# Patient Record
Sex: FEMALE | Race: NATIVE HAWAIIAN OR OTHER PACIFIC ISLANDER | HISPANIC OR LATINO
[De-identification: names, ages, dates, MRNs, and addresses within clinical notes are randomized per-mention and may not be internally consistent; named-entity substitution may affect disease eponyms.]

---

## 2024-02-27 ENCOUNTER — TRANSCRIPTION ENCOUNTER (OUTPATIENT)
Age: 44
End: 2024-02-27

## 2024-02-27 ENCOUNTER — INPATIENT (INPATIENT)
Facility: HOSPITAL | Age: 44
LOS: 1 days | Discharge: HOME | DRG: 41 | End: 2024-02-29
Attending: SPECIALIST | Admitting: SPECIALIST
Payer: COMMERCIAL

## 2024-02-27 VITALS
WEIGHT: 115.08 LBS | DIASTOLIC BLOOD PRESSURE: 63 MMHG | TEMPERATURE: 98 F | SYSTOLIC BLOOD PRESSURE: 110 MMHG | HEART RATE: 79 BPM | OXYGEN SATURATION: 97 % | HEIGHT: 63 IN | RESPIRATION RATE: 20 BRPM

## 2024-02-27 LAB
ALBUMIN SERPL ELPH-MCNC: 4.7 G/DL — SIGNIFICANT CHANGE UP (ref 3.3–5)
ALP SERPL-CCNC: 62 U/L — SIGNIFICANT CHANGE UP (ref 40–120)
ALT FLD-CCNC: 18 U/L — SIGNIFICANT CHANGE UP (ref 10–45)
ANION GAP SERPL CALC-SCNC: 12 MMOL/L — SIGNIFICANT CHANGE UP (ref 5–17)
APTT BLD: 27 SEC — SIGNIFICANT CHANGE UP (ref 24.5–35.6)
AST SERPL-CCNC: 27 U/L — SIGNIFICANT CHANGE UP (ref 10–40)
BASOPHILS # BLD AUTO: 0.03 K/UL — SIGNIFICANT CHANGE UP (ref 0–0.2)
BASOPHILS NFR BLD AUTO: 0.3 % — SIGNIFICANT CHANGE UP (ref 0–2)
BILIRUB SERPL-MCNC: 0.3 MG/DL — SIGNIFICANT CHANGE UP (ref 0.2–1.2)
BLD GP AB SCN SERPL QL: NEGATIVE — SIGNIFICANT CHANGE UP
BUN SERPL-MCNC: 13 MG/DL — SIGNIFICANT CHANGE UP (ref 7–23)
CALCIUM SERPL-MCNC: 9.6 MG/DL — SIGNIFICANT CHANGE UP (ref 8.4–10.5)
CHLORIDE SERPL-SCNC: 102 MMOL/L — SIGNIFICANT CHANGE UP (ref 96–108)
CO2 SERPL-SCNC: 25 MMOL/L — SIGNIFICANT CHANGE UP (ref 22–31)
CREAT SERPL-MCNC: 0.62 MG/DL — SIGNIFICANT CHANGE UP (ref 0.5–1.3)
EGFR: 113 ML/MIN/1.73M2 — SIGNIFICANT CHANGE UP
EOSINOPHIL # BLD AUTO: 0.15 K/UL — SIGNIFICANT CHANGE UP (ref 0–0.5)
EOSINOPHIL NFR BLD AUTO: 1.6 % — SIGNIFICANT CHANGE UP (ref 0–6)
ETHANOL SERPL-MCNC: <10 MG/DL — SIGNIFICANT CHANGE UP (ref 0–10)
GLUCOSE SERPL-MCNC: 126 MG/DL — HIGH (ref 70–99)
HCG SERPL-ACNC: <2 MIU/ML — SIGNIFICANT CHANGE UP
HCT VFR BLD CALC: 42.5 % — SIGNIFICANT CHANGE UP (ref 34.5–45)
HGB BLD-MCNC: 13.8 G/DL — SIGNIFICANT CHANGE UP (ref 11.5–15.5)
IMM GRANULOCYTES NFR BLD AUTO: 0.5 % — SIGNIFICANT CHANGE UP (ref 0–0.9)
INR BLD: 1.06 RATIO — SIGNIFICANT CHANGE UP (ref 0.85–1.18)
LIDOCAIN IGE QN: 70 U/L — HIGH (ref 7–60)
LYMPHOCYTES # BLD AUTO: 4.51 K/UL — HIGH (ref 1–3.3)
LYMPHOCYTES # BLD AUTO: 48.2 % — HIGH (ref 13–44)
MCHC RBC-ENTMCNC: 29.6 PG — SIGNIFICANT CHANGE UP (ref 27–34)
MCHC RBC-ENTMCNC: 32.5 GM/DL — SIGNIFICANT CHANGE UP (ref 32–36)
MCV RBC AUTO: 91.2 FL — SIGNIFICANT CHANGE UP (ref 80–100)
MONOCYTES # BLD AUTO: 0.63 K/UL — SIGNIFICANT CHANGE UP (ref 0–0.9)
MONOCYTES NFR BLD AUTO: 6.7 % — SIGNIFICANT CHANGE UP (ref 2–14)
NEUTROPHILS # BLD AUTO: 3.98 K/UL — SIGNIFICANT CHANGE UP (ref 1.8–7.4)
NEUTROPHILS NFR BLD AUTO: 42.7 % — LOW (ref 43–77)
NRBC # BLD: 0 /100 WBCS — SIGNIFICANT CHANGE UP (ref 0–0)
PLATELET # BLD AUTO: 192 K/UL — SIGNIFICANT CHANGE UP (ref 150–400)
POTASSIUM SERPL-MCNC: 3.1 MMOL/L — LOW (ref 3.5–5.3)
POTASSIUM SERPL-SCNC: 3.1 MMOL/L — LOW (ref 3.5–5.3)
PROT SERPL-MCNC: 8 G/DL — SIGNIFICANT CHANGE UP (ref 6–8.3)
PROTHROM AB SERPL-ACNC: 11.6 SEC — SIGNIFICANT CHANGE UP (ref 9.5–13)
RBC # BLD: 4.66 M/UL — SIGNIFICANT CHANGE UP (ref 3.8–5.2)
RBC # FLD: 14.2 % — SIGNIFICANT CHANGE UP (ref 10.3–14.5)
RH IG SCN BLD-IMP: POSITIVE — SIGNIFICANT CHANGE UP
SODIUM SERPL-SCNC: 139 MMOL/L — SIGNIFICANT CHANGE UP (ref 135–145)
TROPONIN T, HIGH SENSITIVITY RESULT: <6 NG/L — SIGNIFICANT CHANGE UP (ref 0–51)
WBC # BLD: 9.35 K/UL — SIGNIFICANT CHANGE UP (ref 3.8–10.5)
WBC # FLD AUTO: 9.35 K/UL — SIGNIFICANT CHANGE UP (ref 3.8–10.5)

## 2024-02-27 PROCEDURE — 73130 X-RAY EXAM OF HAND: CPT | Mod: 26,LT

## 2024-02-27 PROCEDURE — 73090 X-RAY EXAM OF FOREARM: CPT | Mod: 26,LT

## 2024-02-27 PROCEDURE — 73080 X-RAY EXAM OF ELBOW: CPT | Mod: 26,LT

## 2024-02-27 PROCEDURE — 72170 X-RAY EXAM OF PELVIS: CPT | Mod: 26

## 2024-02-27 PROCEDURE — 73090 X-RAY EXAM OF FOREARM: CPT | Mod: 26,LT,77

## 2024-02-27 PROCEDURE — 70496 CT ANGIOGRAPHY HEAD: CPT | Mod: 26,MC

## 2024-02-27 PROCEDURE — 71260 CT THORAX DX C+: CPT | Mod: 26,MC

## 2024-02-27 PROCEDURE — 73110 X-RAY EXAM OF WRIST: CPT | Mod: 26,LT

## 2024-02-27 PROCEDURE — 70498 CT ANGIOGRAPHY NECK: CPT | Mod: 26,MC

## 2024-02-27 PROCEDURE — 73060 X-RAY EXAM OF HUMERUS: CPT | Mod: 26,LT

## 2024-02-27 PROCEDURE — 74177 CT ABD & PELVIS W/CONTRAST: CPT | Mod: 26,MC

## 2024-02-27 PROCEDURE — 73100 X-RAY EXAM OF WRIST: CPT | Mod: 26,59,LT

## 2024-02-27 PROCEDURE — 73030 X-RAY EXAM OF SHOULDER: CPT | Mod: 26,LT

## 2024-02-27 PROCEDURE — 72125 CT NECK SPINE W/O DYE: CPT | Mod: 26,MC

## 2024-02-27 PROCEDURE — 71045 X-RAY EXAM CHEST 1 VIEW: CPT | Mod: 26

## 2024-02-27 PROCEDURE — 70450 CT HEAD/BRAIN W/O DYE: CPT | Mod: 26,MC,59

## 2024-02-27 PROCEDURE — 99291 CRITICAL CARE FIRST HOUR: CPT

## 2024-02-27 RX ORDER — HYDROMORPHONE HYDROCHLORIDE 2 MG/ML
0.5 INJECTION INTRAMUSCULAR; INTRAVENOUS; SUBCUTANEOUS ONCE
Refills: 0 | Status: DISCONTINUED | OUTPATIENT
Start: 2024-02-27 | End: 2024-02-27

## 2024-02-27 RX ORDER — FENTANYL CITRATE 50 UG/ML
50 INJECTION INTRAVENOUS ONCE
Refills: 0 | Status: DISCONTINUED | OUTPATIENT
Start: 2024-02-27 | End: 2024-02-27

## 2024-02-27 RX ORDER — SODIUM CHLORIDE 9 MG/ML
1000 INJECTION, SOLUTION INTRAVENOUS
Refills: 0 | Status: DISCONTINUED | OUTPATIENT
Start: 2024-02-27 | End: 2024-02-28

## 2024-02-27 RX ORDER — SODIUM CHLORIDE 9 MG/ML
250 INJECTION INTRAMUSCULAR; INTRAVENOUS; SUBCUTANEOUS ONCE
Refills: 0 | Status: COMPLETED | OUTPATIENT
Start: 2024-02-27 | End: 2024-02-27

## 2024-02-27 RX ORDER — ACETAMINOPHEN 500 MG
1000 TABLET ORAL ONCE
Refills: 0 | Status: COMPLETED | OUTPATIENT
Start: 2024-02-27 | End: 2024-02-27

## 2024-02-27 RX ADMIN — Medication 400 MILLIGRAM(S): at 22:47

## 2024-02-27 RX ADMIN — FENTANYL CITRATE 50 MICROGRAM(S): 50 INJECTION INTRAVENOUS at 20:15

## 2024-02-27 RX ADMIN — FENTANYL CITRATE 50 MICROGRAM(S): 50 INJECTION INTRAVENOUS at 19:44

## 2024-02-27 RX ADMIN — FENTANYL CITRATE 50 MICROGRAM(S): 50 INJECTION INTRAVENOUS at 20:00

## 2024-02-27 RX ADMIN — HYDROMORPHONE HYDROCHLORIDE 0.5 MILLIGRAM(S): 2 INJECTION INTRAMUSCULAR; INTRAVENOUS; SUBCUTANEOUS at 21:35

## 2024-02-27 RX ADMIN — HYDROMORPHONE HYDROCHLORIDE 0.5 MILLIGRAM(S): 2 INJECTION INTRAMUSCULAR; INTRAVENOUS; SUBCUTANEOUS at 22:00

## 2024-02-27 RX ADMIN — FENTANYL CITRATE 50 MICROGRAM(S): 50 INJECTION INTRAVENOUS at 19:29

## 2024-02-27 RX ADMIN — SODIUM CHLORIDE 250 MILLILITER(S): 9 INJECTION INTRAMUSCULAR; INTRAVENOUS; SUBCUTANEOUS at 22:47
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
## 2024-02-27 NOTE — CONSULT NOTE ADULT - PROBLEM SELECTOR RECOMMENDATION 9
Patient scheduled for an Open reduction and internal fixation of the left forearm  No contraindication to scheduled procedure  NPO after MN   DVT and GI prophylaxis as per ortho
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
## 2024-02-27 NOTE — ED PROVIDER NOTE - PHYSICAL EXAMINATION
ABC intact GCS 15 cervical collar in place  Neurologic exam is otherwise unremarkable clear lungs S1-S2 soft nontender abdomen stable pelvis no deformity of the lower extremities no tenderness or palpation over the spine left upper extremity noted to have a severe deformity at mid forearm with palpable pulse but limited motor movement due to pain
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
## 2024-02-27 NOTE — ED ADULT NURSE NOTE - NS ED NOTE  TALK SOMEONE YN
Please call regarding labs.  Cholesterol is elevated.  We did discuss potentially starting a new medication last time she was in clinic so I sent in a prescription for Crestor 5 mg.  Please schedule repeat lipid profile and CMP in 2-3 months.   No

## 2024-02-27 NOTE — ED ADULT NURSE NOTE - NSFALLUNIVINTERV_ED_ALL_ED
Bed/Stretcher in lowest position, wheels locked, appropriate side rails in place/Call bell, personal items and telephone in reach/Instruct patient to call for assistance before getting out of bed/chair/stretcher/Non-slip footwear applied when patient is off stretcher/Malcolm to call system/Physically safe environment - no spills, clutter or unnecessary equipment/Purposeful proactive rounding/Room/bathroom lighting operational, light cord in reach

## 2024-02-28 ENCOUNTER — TRANSCRIPTION ENCOUNTER (OUTPATIENT)
Age: 44
End: 2024-02-28

## 2024-02-28 DIAGNOSIS — S52.92XA UNSPECIFIED FRACTURE OF LEFT FOREARM, INITIAL ENCOUNTER FOR CLOSED FRACTURE: ICD-10-CM

## 2024-02-28 DIAGNOSIS — R52 PAIN, UNSPECIFIED: ICD-10-CM

## 2024-02-28 DIAGNOSIS — S52.90XA UNSPECIFIED FRACTURE OF UNSPECIFIED FOREARM, INITIAL ENCOUNTER FOR CLOSED FRACTURE: ICD-10-CM

## 2024-02-28 DIAGNOSIS — E87.6 HYPOKALEMIA: ICD-10-CM

## 2024-02-28 LAB
ANION GAP SERPL CALC-SCNC: 14 MMOL/L — SIGNIFICANT CHANGE UP (ref 5–17)
APTT BLD: 26.7 SEC — SIGNIFICANT CHANGE UP (ref 24.5–35.6)
BLD GP AB SCN SERPL QL: NEGATIVE — SIGNIFICANT CHANGE UP
BUN SERPL-MCNC: 10 MG/DL — SIGNIFICANT CHANGE UP (ref 7–23)
CALCIUM SERPL-MCNC: 9 MG/DL — SIGNIFICANT CHANGE UP (ref 8.4–10.5)
CHLORIDE SERPL-SCNC: 101 MMOL/L — SIGNIFICANT CHANGE UP (ref 96–108)
CO2 SERPL-SCNC: 20 MMOL/L — LOW (ref 22–31)
CREAT SERPL-MCNC: 0.55 MG/DL — SIGNIFICANT CHANGE UP (ref 0.5–1.3)
EGFR: 117 ML/MIN/1.73M2 — SIGNIFICANT CHANGE UP
GLUCOSE SERPL-MCNC: 114 MG/DL — HIGH (ref 70–99)
HCG SERPL-ACNC: <2 MIU/ML — SIGNIFICANT CHANGE UP
HCT VFR BLD CALC: 39.4 % — SIGNIFICANT CHANGE UP (ref 34.5–45)
HGB BLD-MCNC: 13.1 G/DL — SIGNIFICANT CHANGE UP (ref 11.5–15.5)
INR BLD: 1.07 RATIO — SIGNIFICANT CHANGE UP (ref 0.85–1.18)
MCHC RBC-ENTMCNC: 29.8 PG — SIGNIFICANT CHANGE UP (ref 27–34)
MCHC RBC-ENTMCNC: 33.2 GM/DL — SIGNIFICANT CHANGE UP (ref 32–36)
MCV RBC AUTO: 89.7 FL — SIGNIFICANT CHANGE UP (ref 80–100)
NRBC # BLD: 0 /100 WBCS — SIGNIFICANT CHANGE UP (ref 0–0)
PLATELET # BLD AUTO: 168 K/UL — SIGNIFICANT CHANGE UP (ref 150–400)
POTASSIUM SERPL-MCNC: 3.6 MMOL/L — SIGNIFICANT CHANGE UP (ref 3.5–5.3)
POTASSIUM SERPL-SCNC: 3.6 MMOL/L — SIGNIFICANT CHANGE UP (ref 3.5–5.3)
PROTHROM AB SERPL-ACNC: 11.7 SEC — SIGNIFICANT CHANGE UP (ref 9.5–13)
RBC # BLD: 4.39 M/UL — SIGNIFICANT CHANGE UP (ref 3.8–5.2)
RBC # FLD: 14.1 % — SIGNIFICANT CHANGE UP (ref 10.3–14.5)
RH IG SCN BLD-IMP: POSITIVE — SIGNIFICANT CHANGE UP
SODIUM SERPL-SCNC: 135 MMOL/L — SIGNIFICANT CHANGE UP (ref 135–145)
WBC # BLD: 16.96 K/UL — HIGH (ref 3.8–10.5)
WBC # FLD AUTO: 16.96 K/UL — HIGH (ref 3.8–10.5)

## 2024-02-28 PROCEDURE — 64721 CARPAL TUNNEL SURGERY: CPT | Mod: 59,LT

## 2024-02-28 PROCEDURE — 25575 OPTX RDL&ULN SHFT FX RDS&ULN: CPT | Mod: LT

## 2024-02-28 PROCEDURE — 72190 X-RAY EXAM OF PELVIS: CPT | Mod: 26

## 2024-02-28 PROCEDURE — 27197 CLSD TX PELVIC RING FX: CPT

## 2024-02-28 DEVICE — SCREW CORT S-T 3.5X12MM: Type: IMPLANTABLE DEVICE | Site: LEFT | Status: FUNCTIONAL

## 2024-02-28 DEVICE — SCREW CORT S-T 3.5X14MM: Type: IMPLANTABLE DEVICE | Site: LEFT | Status: FUNCTIONAL

## 2024-02-28 DEVICE — SCREW CANC 4X12MM: Type: IMPLANTABLE DEVICE | Site: LEFT | Status: FUNCTIONAL

## 2024-02-28 DEVICE — IMPLANTABLE DEVICE: Type: IMPLANTABLE DEVICE | Site: LEFT | Status: FUNCTIONAL

## 2024-02-28 RX ORDER — SODIUM CHLORIDE 9 MG/ML
500 INJECTION INTRAMUSCULAR; INTRAVENOUS; SUBCUTANEOUS ONCE
Refills: 0 | Status: COMPLETED | OUTPATIENT
Start: 2024-02-29 | End: 2024-02-29

## 2024-02-28 RX ORDER — SENNA PLUS 8.6 MG/1
2 TABLET ORAL AT BEDTIME
Refills: 0 | Status: DISCONTINUED | OUTPATIENT
Start: 2024-02-28 | End: 2024-02-28

## 2024-02-28 RX ORDER — ACETAMINOPHEN 500 MG
975 TABLET ORAL EVERY 8 HOURS
Refills: 0 | Status: DISCONTINUED | OUTPATIENT
Start: 2024-02-28 | End: 2024-02-28

## 2024-02-28 RX ORDER — ACETAMINOPHEN 500 MG
3 TABLET ORAL
Qty: 0 | Refills: 0 | DISCHARGE
Start: 2024-02-28

## 2024-02-28 RX ORDER — CALCIUM CARBONATE 500(1250)
1 TABLET ORAL
Refills: 0 | Status: DISCONTINUED | OUTPATIENT
Start: 2024-02-28 | End: 2024-02-29

## 2024-02-28 RX ORDER — CEFAZOLIN SODIUM 1 G
2000 VIAL (EA) INJECTION EVERY 8 HOURS
Refills: 0 | Status: COMPLETED | OUTPATIENT
Start: 2024-02-28 | End: 2024-02-29

## 2024-02-28 RX ORDER — FAMOTIDINE 10 MG/ML
20 INJECTION INTRAVENOUS
Refills: 0 | Status: DISCONTINUED | OUTPATIENT
Start: 2024-02-28 | End: 2024-02-29

## 2024-02-28 RX ORDER — HYDROMORPHONE HYDROCHLORIDE 2 MG/ML
0.5 INJECTION INTRAMUSCULAR; INTRAVENOUS; SUBCUTANEOUS
Refills: 0 | Status: DISCONTINUED | OUTPATIENT
Start: 2024-02-28 | End: 2024-02-28

## 2024-02-28 RX ORDER — OXYCODONE HYDROCHLORIDE 5 MG/1
5 TABLET ORAL EVERY 4 HOURS
Refills: 0 | Status: DISCONTINUED | OUTPATIENT
Start: 2024-02-28 | End: 2024-02-29

## 2024-02-28 RX ORDER — SODIUM CHLORIDE 9 MG/ML
1000 INJECTION INTRAMUSCULAR; INTRAVENOUS; SUBCUTANEOUS
Refills: 0 | Status: DISCONTINUED | OUTPATIENT
Start: 2024-02-28 | End: 2024-02-28

## 2024-02-28 RX ORDER — ACETAMINOPHEN 500 MG
1000 TABLET ORAL ONCE
Refills: 0 | Status: COMPLETED | OUTPATIENT
Start: 2024-02-28 | End: 2024-02-28

## 2024-02-28 RX ORDER — SENNA PLUS 8.6 MG/1
2 TABLET ORAL
Qty: 0 | Refills: 0 | DISCHARGE
Start: 2024-02-28

## 2024-02-28 RX ORDER — SENNA PLUS 8.6 MG/1
2 TABLET ORAL AT BEDTIME
Refills: 0 | Status: DISCONTINUED | OUTPATIENT
Start: 2024-02-28 | End: 2024-02-29

## 2024-02-28 RX ORDER — OXYCODONE HYDROCHLORIDE 5 MG/1
5 TABLET ORAL EVERY 4 HOURS
Refills: 0 | Status: DISCONTINUED | OUTPATIENT
Start: 2024-02-28 | End: 2024-02-28

## 2024-02-28 RX ORDER — MAGNESIUM HYDROXIDE 400 MG/1
30 TABLET, CHEWABLE ORAL DAILY
Refills: 0 | Status: DISCONTINUED | OUTPATIENT
Start: 2024-02-28 | End: 2024-02-29

## 2024-02-28 RX ORDER — OXYCODONE HYDROCHLORIDE 5 MG/1
2.5 TABLET ORAL EVERY 4 HOURS
Refills: 0 | Status: DISCONTINUED | OUTPATIENT
Start: 2024-02-28 | End: 2024-02-29

## 2024-02-28 RX ORDER — ENOXAPARIN SODIUM 100 MG/ML
40 INJECTION SUBCUTANEOUS EVERY 24 HOURS
Refills: 0 | Status: DISCONTINUED | OUTPATIENT
Start: 2024-02-29 | End: 2024-02-29

## 2024-02-28 RX ORDER — POTASSIUM CHLORIDE 20 MEQ
20 PACKET (EA) ORAL
Refills: 0 | Status: DISCONTINUED | OUTPATIENT
Start: 2024-02-28 | End: 2024-02-28

## 2024-02-28 RX ORDER — OXYCODONE HYDROCHLORIDE 5 MG/1
2.5 TABLET ORAL EVERY 4 HOURS
Refills: 0 | Status: DISCONTINUED | OUTPATIENT
Start: 2024-02-28 | End: 2024-02-28

## 2024-02-28 RX ORDER — SODIUM CHLORIDE 9 MG/ML
500 INJECTION INTRAMUSCULAR; INTRAVENOUS; SUBCUTANEOUS ONCE
Refills: 0 | Status: COMPLETED | OUTPATIENT
Start: 2024-02-28 | End: 2024-02-28

## 2024-02-28 RX ORDER — ACETAMINOPHEN 500 MG
975 TABLET ORAL EVERY 8 HOURS
Refills: 0 | Status: DISCONTINUED | OUTPATIENT
Start: 2024-02-29 | End: 2024-02-29

## 2024-02-28 RX ORDER — ONDANSETRON 8 MG/1
4 TABLET, FILM COATED ORAL ONCE
Refills: 0 | Status: DISCONTINUED | OUTPATIENT
Start: 2024-02-28 | End: 2024-02-28

## 2024-02-28 RX ORDER — POTASSIUM CHLORIDE 20 MEQ
40 PACKET (EA) ORAL ONCE
Refills: 0 | Status: COMPLETED | OUTPATIENT
Start: 2024-02-28 | End: 2024-02-28

## 2024-02-28 RX ORDER — FAMOTIDINE 10 MG/ML
1 INJECTION INTRAVENOUS
Qty: 0 | Refills: 0 | DISCHARGE
Start: 2024-02-28

## 2024-02-28 RX ORDER — MAGNESIUM HYDROXIDE 400 MG/1
30 TABLET, CHEWABLE ORAL DAILY
Refills: 0 | Status: DISCONTINUED | OUTPATIENT
Start: 2024-02-28 | End: 2024-02-28

## 2024-02-28 RX ORDER — CALCIUM CARBONATE 500(1250)
1 TABLET ORAL
Qty: 0 | Refills: 0 | DISCHARGE
Start: 2024-02-28

## 2024-02-28 RX ORDER — INFLUENZA VIRUS VACCINE 15; 15; 15; 15 UG/.5ML; UG/.5ML; UG/.5ML; UG/.5ML
0.5 SUSPENSION INTRAMUSCULAR ONCE
Refills: 0 | Status: DISCONTINUED | OUTPATIENT
Start: 2024-02-28 | End: 2024-02-29

## 2024-02-28 RX ADMIN — OXYCODONE HYDROCHLORIDE 5 MILLIGRAM(S): 5 TABLET ORAL at 03:10

## 2024-02-28 RX ADMIN — OXYCODONE HYDROCHLORIDE 5 MILLIGRAM(S): 5 TABLET ORAL at 16:54

## 2024-02-28 RX ADMIN — SENNA PLUS 2 TABLET(S): 8.6 TABLET ORAL at 21:36

## 2024-02-28 RX ADMIN — SODIUM CHLORIDE 125 MILLILITER(S): 9 INJECTION, SOLUTION INTRAVENOUS at 03:08

## 2024-02-28 RX ADMIN — Medication 100 MILLIGRAM(S): at 17:48

## 2024-02-28 RX ADMIN — OXYCODONE HYDROCHLORIDE 2.5 MILLIGRAM(S): 5 TABLET ORAL at 21:15

## 2024-02-28 RX ADMIN — Medication 400 MILLIGRAM(S): at 17:48

## 2024-02-28 RX ADMIN — OXYCODONE HYDROCHLORIDE 2.5 MILLIGRAM(S): 5 TABLET ORAL at 20:19

## 2024-02-28 RX ADMIN — SODIUM CHLORIDE 1000 MILLILITER(S): 9 INJECTION INTRAMUSCULAR; INTRAVENOUS; SUBCUTANEOUS at 13:52

## 2024-02-28 RX ADMIN — Medication 1000 MILLIGRAM(S): at 23:32

## 2024-02-28 RX ADMIN — Medication 40 MILLIEQUIVALENT(S): at 03:11

## 2024-02-28 RX ADMIN — Medication 1000 MILLIGRAM(S): at 18:48

## 2024-02-28 RX ADMIN — FAMOTIDINE 20 MILLIGRAM(S): 10 INJECTION INTRAVENOUS at 17:48

## 2024-02-28 RX ADMIN — Medication 400 MILLIGRAM(S): at 23:02

## 2024-02-28 RX ADMIN — Medication 1 TABLET(S): at 17:47

## 2024-02-28 RX ADMIN — Medication 1 TABLET(S): at 21:36

## 2024-02-28 NOTE — BRIEF OPERATIVE NOTE - NSICDXBRIEFPROCEDURE_GEN_ALL_CORE_FT
PROCEDURES:  ORIF, fracture, radius and ulna 28-Feb-2024 12:37:34  Bridger Sadler  Carpal tunnel release 28-Feb-2024 12:38:49  Bridger Sadler

## 2024-02-28 NOTE — PATIENT PROFILE ADULT - NSPROPTRIGHTNOTIFY_GEN_A_NUR
T(C): 37.3 (03-07-19 @ 19:32), Max: 37.3 (03-07-19 @ 19:32)  HR: 73 (03-07-19 @ 19:32) (68 - 92)  BP: 183/105 (03-07-19 @ 19:32) (143/92 - 183/105)  RR: 189 (03-07-19 @ 19:32) (18 - 189)  SpO2: 98% (03-07-19 @ 17:39) (98% - 100%)  Wt(kg): --    Physical Exam:   GENERAL: well-groomed, well-developed, NAD  HEENT: head NC/AT; EOM intact, conjunctiva & sclera clear; hearing grossly intact, moist mucous membranes  NECK: supple, no JVD  RESPIRATORY: CTA B/L, no wheezing, rales, rhonchi or rubs  CARDIOVASCULAR: S1&S2, RRR, no murmurs or gallops  ABDOMEN: soft, non-tender, non-distended, + Bowel sounds x4 quadrants, no guarding, rebound or rigidity  MUSCULOSKELETAL:  no clubbing, cyanosis or edema of all 4 extremities  LYMPH: no cervical lymphadenopathy  VASCULAR: Radial pulses 2+ bilaterally, no varicose veins   SKIN: warm and dry, color normal  NEUROLOGIC: AA&O X3, CN2-12 intact w/ no focal deficits, no sensory loss, motor Strength 5/5 in UE & LE B/L  Psych: Normal mood and affect, normal behavior declines
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
## 2024-02-28 NOTE — PHYSICAL THERAPY INITIAL EVALUATION ADULT - ADDITIONAL COMMENTS
Patient lives with her son and daughter in an apartment building w/o elevator; No JESÚS & 8 steps w/RT sided handrail to reach 3rd floor; PTA, pt. was independent w/ADLs & mobility w/o use of AD. Pt. reports her son or daughter will be at home to assist if d/c home
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
## 2024-02-28 NOTE — PATIENT PROFILE ADULT - FALL HARM RISK - UNIVERSAL INTERVENTIONS
Bed in lowest position, wheels locked, appropriate side rails in place/Call bell, personal items and telephone in reach/Instruct patient to call for assistance before getting out of bed or chair/Non-slip footwear when patient is out of bed/Paden to call system/Physically safe environment - no spills, clutter or unnecessary equipment/Purposeful Proactive Rounding/Room/bathroom lighting operational, light cord in reach
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
## 2024-02-28 NOTE — DISCHARGE NOTE PROVIDER - NSDCFUADDINST_GEN_ALL_CORE_FT
1. Keep dressing/splint clean and dry   2. ice to affected incision every 4-6 hours x 72 hours   3. elevate affected extremity when @ rest   4: You may be WEIGHT BEARING as Tolerated Both Legs       NON-Weight Bearing L arm -         Range of motion exercises encouraged Shoulder / elbow / Fingers        sling for comfort   5. Continue ECOTRIN 81 mg by mouth 2x / day (at breakfast and at dinner) for a total of (4) WEEKS [ or when told to stop by your SURGEON]  6 . sponge bath only until OK w/ Surgeon  Call MD for excessive pain, persistent fevers, pain NOT relieved by medications.  Do not drive or lift any heavy objects   7. Follow up with Dr Schaeffer in 10-14 days for dressing REMOVAL, wound check, and staple/suture removal (if applicable)   Please call for an appointment

## 2024-02-28 NOTE — BRIEF OPERATIVE NOTE - SECOND ASSIST NAME
Bridger Sadler (Resident) no diarrhea/no change in bowel habits/no vomiting/no abdominal pain/no melena/no constipation/no jaundice/no nausea/no hematochezia

## 2024-02-28 NOTE — DISCHARGE NOTE PROVIDER - NSDCCPCAREPLAN_GEN_ALL_CORE_FT
PRINCIPAL DISCHARGE DIAGNOSIS  Diagnosis: Forearm fractures, both bones, closed, left, initial encounter  Assessment and Plan of Treatment:

## 2024-02-28 NOTE — PHYSICAL THERAPY INITIAL EVALUATION ADULT - NSPTDISCHREC_GEN_A_CORE
d/c to home with assist/supervision from family as needed/Outpatient PT d/c to home with assist/supervision from family as needed, Pt cleared for d/c from PT standpoint, DAKOTA Keller and BERNARD Kingston notified/Outpatient PT

## 2024-02-28 NOTE — PHYSICAL THERAPY INITIAL EVALUATION ADULT - ACTIVE RANGE OF MOTION EXAMINATION, REHAB EVAL
SIGRIDE NT in splint, shoulder flexion ROM WFL/Right UE Active ROM was WFL (within functional limits)/bilateral  lower extremity Active ROM was WFL (within functional limits)

## 2024-02-28 NOTE — DISCHARGE NOTE PROVIDER - REASON FOR ADMISSION
1) L Ulna / radius Fracture  s/p Open Reduction Internal Fixation / Surgical Repair L Ulna / Rqdius    L superior/inferior Rami Fracture (non-op) 1) L Ulna / radius Fracture  s/p Open Reduction Internal Fixation / Surgical Repair L Ulna / Radius    L superior/inferior Rami Fracture (non-op)

## 2024-02-28 NOTE — PHYSICAL THERAPY INITIAL EVALUATION ADULT - GENERAL OBSERVATIONS, REHAB EVAL
Pt. rec' d in bed, NAD, +IVL, +LT forearm splint, agreeable to PT latisha. Pt. rec' d in bed, NAD, +IVL, +LT forearm splint, agreeable to PT nancyal, daughter at bedside.

## 2024-02-28 NOTE — ED ADULT NURSE REASSESSMENT NOTE - NS ED NURSE REASSESS COMMENT FT1
Received report from Linda RODRIGUEZ. Patient resting comfortably in stretcher. A&Ox4. Patient in no acute distress. Patient states she does not need any pain medication at this time. Capillary refill less than 3 seconds in the left hand. Patient pending inpatient surgery bed assignment or transfer to the OR. Plan of care discussed. Safety and comfort measures maintained.
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
## 2024-02-28 NOTE — DISCHARGE NOTE PROVIDER - CARE PROVIDER_API CALL
Ernesto Schaeffer  Orthopaedic Surgery  [address], [facility] [address]  [place] [address]  Phone: [phone]  Fax: [phone]  Follow Up Time:

## 2024-02-28 NOTE — PRE-ANESTHESIA EVALUATION ADULT - NSANTHOSAYNRD_GEN_A_CORE
dr. elmore  pmd [phone] No. MARTA screening performed.  STOP BANG Legend: 0-2 = LOW Risk; 3-4 = INTERMEDIATE Risk; 5-8 = HIGH Risk

## 2024-02-28 NOTE — PATIENT PROFILE ADULT - FUNCTIONAL ASSESSMENT - BASIC MOBILITY 3.
ID Progress Note    Indio Edwards is a 61 year old male with pancreatitis    S: stable, no new issue, abd pain ++ , no fever, no cough, no shortness of breath         Lying on bed,  comfortable  ROS:    General: no fever, ++pain  HEENT: no visual problems, no hearing issues, no headache  Chest: no coughing, no shortness of breath  CVS: no chest pain, no palpitation  GI: no nausea, vomiting, no diarrhea, ++ abd pain  : no urinary frequency, no dysuria, no discharge  Ext no edema   Skin: no rash  Neuro: no weakness, no tremors, no haedache  Psych: no anxiety, no depression        O:        Vital Last Value 24 Hour Range   Temperature 97.2 °F (36.2 °C) (01/08/17 1605) Temp  Min: 97.2 °F (36.2 °C)  Max: 97.9 °F (36.6 °C)   Pulse 99 (01/08/17 1605) Pulse  Min: 75  Max: 99   Respiratory 18 (01/08/17 1605) Resp  Min: 18  Max: 20   Non-Invasive  Blood Pressure 126/69 (01/08/17 1605) BP  Min: 123/86  Max: 140/90   Pulse Oximetry 95 % (01/08/17 1605) SpO2  Min: 93 %  Max: 95 %   Arterial   Blood Pressure   No Data Recorded     Vital Today Admit   Weight 79.9 kg (01/02/17 0400) Weight: 81.6 kg (01/01/17 2310)   Height N/A Height: 5' 4\" (162.6 cm) (01/01/17 2310)   BMI N/A BMI (Calculated): 30.96 (01/01/17 2310)        I/O last 3 completed shifts:  In: 2474 [P.O.:2040; I.V.:419; Other:15]  Out: 730 [Urine:700; Drains:30]      Intake/Output Summary (Last 24 hours) at 01/08/17 2212  Last data filed at 01/08/17 2202   Gross per 24 hour   Intake             2174 ml   Output              850 ml   Net             1324 ml        EXAM:  GEN:  Stable, not in acute distress  HEENT: no icterus, no conjunctival hemorrhages  NECK: supple  CVS: s1s2 audible, regular  CHEST: clear, decreased breath sounds at bases, no wheezing, no rales  ABD: soft,  tender  EXT: no edema, no ulcers  NEURO: stable  SKIN: no rash, no ulcer  LYMP: no cervical or inguinallymphadenopathy  PSYCH: stable, not anxious  or depressed     LABS:     Reviewed:    Recent Labs  Lab 01/08/17  0439 01/07/17  0455 01/06/17  1921 01/06/17  0445 01/06/17  0040 01/05/17  0414 01/04/17  1530 01/04/17  0418  01/03/17  0430 01/02/17  0745 01/01/17  2343   HGB 12.1* 12.2*  --  10.9*  --  10.7*  --  11.3*  --  11.9* 13.2 13.6   WBC 14.4* 12.6*  --  17.4*  --  14.4*  --  13.7*  --  13.4* 13.0* 13.7*   SEG 76 87  --  76  --  77  --  83  --  81 89 78    339  --  294  --  246  --  238  --  239 256 297   SODIUM 135 135  --  133* 132* 134*  --  132*  --  132* 133* 131*   POTASSIUM 4.1 3.9 4.4 3.7 4.4 3.7 4.3 3.8  < > 3.5 3.8 4.0   CHLORIDE 98 98  --  100 99 101  --  98  --  98 100 94*   BUN 19 11  --  12 15 8*  --  4*  --  5* 11 14   CREATININE 0.54* 0.59*  --  0.60* 0.64* 0.55*  --  0.56*  --  0.56* 0.56* 0.76   BILIRUBIN  --   --   --   --   --  0.3  --  0.4  --   --  0.4 0.4   GPT  --   --   --   --   --  57  --  64  --   --  85* 96*   AST  --   --   --   --   --  19  --  23  --   --  34 43*   ALKPT  --   --   --   --   --  54  --  65  --   --  77 84   < > = values in this interval not displayed.     CULTURES:  Reviewed: Blood:                     Urine:                     Sputum:                      Wound:                      Other: citrobacter, strep anginosus, klebseilla     IMAGING:    Reviewed: CXR:                      CT Scan:                      MRI:                                     Other     MEDS:  • insulin glargine  35 Units Subcutaneous Nightly   • magnesium citrate  300 mL Oral Once   • famotidine  20 mg Oral 2 times per day   • meropenem (MERREM) IVPB  1 g Intravenous 3 times per day   • enoxaparin (LOVENOX) injection  40 mg Subcutaneous Q24H   • atorvastatin  80 mg Oral Daily   • docusate calcium  240 mg Oral BID   • lisinopril-hydrochlorothiazide  1 tablet Oral Daily   • predniSONE  40 mg Oral Daily   • sodium chloride (PF)  2 mL Injection 2 times per day   • insulin regular (human)   Subcutaneous TID AC   • methotrexate   20 mg Oral Once per day on Sat        ASSESSMENT:      Sepsis  Fever  Leucocytosis  Bactaremia  Abdominal pain  Pancreatitis  Citrobacter, strep anginosus, klebseilla     PLAN:      Antibiotics: meropenem, continue same  Antibiotics duration: 2-3  Follow final cultures results and sensitivities  picc  Follow CBC  Follow Imaging,   Local care  Follow cr    Counseling done  Risk ,benefit discussed,   Side effects of treatment discussed with patient,   all questions answered.    Follow up as outpatient on discharge in 2 weeks    Discussed with: Patient, RN  Will follow,    Thanks,    Katie Eagle MD    Please dial [phone] for answering service  CC: Trisha Charles MD, No Pcp     4 = No assist / stand by assistance

## 2024-02-28 NOTE — CHART NOTE - NSCHARTNOTEFT_GEN_A_CORE
POc    No complaints; Denies SOB/CP/N/V;  Block / Anesthesia still in effect    T(C): 36.4 (02-28-24 @ 12:40)  T(F): 97.5 (02-28-24 @ 12:40)  HR: 81 (02-28-24 @ 13:15)  BP: 94/50 (02-28-24 @ 13:15)  RR: 16 (02-28-24 @ 13:15)  SpO2: 98% (02-28-24 @ 13:15)  Wt(kg): --    Physical Exam  Gen: NAD    LUE:   Dressing CDI;   Posey block in place  Drain yes [ ]  No [x ]  Decreased motor and sensory 2/2 anesthesia / block  digits: warm / well-perfused  cap refill brisk @ 2-3 secs                           13.1   16.96<H> )-----------( 168      ( 28 Feb 2024 01:15 )             39.4     02-28    135  |  101  |  10  ----------------------------<  114<H>  3.6   |  20<L>  |  0.55        A/P: 43y Female s/p Open Reduction Internal Fixation / Surgical Repair L Ulna/radius Fx                          s/p L Sup/Inf Pubic Rami Fx (non-op)    -Pain control; Ice prn; Elevate  -DVT ppx; IS  -Am labs  -PT / OT  WBAT BLE  NWB LUE  Shoulder / Elbow / digital ROM  OK  -Dispo planning: anticipate home      ***See Above  Spencer DUNCAN  Orthopedics  B: [id number]

## 2024-02-28 NOTE — DISCHARGE NOTE PROVIDER - NSDCMRMEDTOKEN_GEN_ALL_CORE_FT
Other acetaminophen 325 mg oral tablet: 3 tab(s) orally every 8 hours x ONE (1) WEEK, then as needed  calcium carbonate 1250 mg (500 mg elemental calcium) oral tablet: 1 tab(s) orally 2 times a day  famotidine 20 mg oral tablet: 1 tab(s) orally 2 times a day Purchase over-the-counter and continue taking while on pain meds to prevent upset stomach.  Multiple Vitamins oral tablet: 1 tab(s) orally once a day  senna leaf extract oral tablet: 2 tab(s) orally once a day (at bedtime) while on pain medications   acetaminophen 325 mg oral tablet: 3 tab(s) orally every 8 hours x ONE (1) WEEK, then as needed  calcium carbonate 1250 mg (500 mg elemental calcium) oral tablet: 1 tab(s) orally 2 times a day  Ecotrin Adult Low Strength 81 mg oral delayed release tablet: 1 tab(s) orally 2 times a day x (4) weeks or when told to STOP by your SURGEON  famotidine 20 mg oral tablet: 1 tab(s) orally 2 times a day Purchase over-the-counter and continue taking while on pain meds to prevent upset stomach.  Multiple Vitamins oral tablet: 1 tab(s) orally once a day  Narcan 4 mg/0.1 mL nasal spray: 4 milligram(s) intranasally every 2 to 3 minutes as needed for overdose 1-2 sprays alternating nostrils every 2-3 minutes AS NEEDED  oxyCODONE 5 mg oral tablet: 1 tab(s) orally every 4 hours as needed for Severe Pain MDD: 6  senna leaf extract oral tablet: 2 tab(s) orally once a day (at bedtime) while on pain medications

## 2024-02-29 ENCOUNTER — TRANSCRIPTION ENCOUNTER (OUTPATIENT)
Age: 44
End: 2024-02-29

## 2024-02-29 VITALS
DIASTOLIC BLOOD PRESSURE: 65 MMHG | TEMPERATURE: 99 F | OXYGEN SATURATION: 98 % | SYSTOLIC BLOOD PRESSURE: 107 MMHG | HEART RATE: 79 BPM | RESPIRATION RATE: 18 BRPM

## 2024-02-29 DIAGNOSIS — S32.592A OTHER SPECIFIED FRACTURE OF LEFT PUBIS, INITIAL ENCOUNTER FOR CLOSED FRACTURE: ICD-10-CM

## 2024-02-29 LAB
ANION GAP SERPL CALC-SCNC: 13 MMOL/L — SIGNIFICANT CHANGE UP (ref 5–17)
BUN SERPL-MCNC: 6 MG/DL — LOW (ref 7–23)
CALCIUM SERPL-MCNC: 9 MG/DL — SIGNIFICANT CHANGE UP (ref 8.4–10.5)
CHLORIDE SERPL-SCNC: 104 MMOL/L — SIGNIFICANT CHANGE UP (ref 96–108)
CO2 SERPL-SCNC: 20 MMOL/L — LOW (ref 22–31)
CREAT SERPL-MCNC: 0.59 MG/DL — SIGNIFICANT CHANGE UP (ref 0.5–1.3)
EGFR: 115 ML/MIN/1.73M2 — SIGNIFICANT CHANGE UP
GLUCOSE SERPL-MCNC: 78 MG/DL — SIGNIFICANT CHANGE UP (ref 70–99)
HCT VFR BLD CALC: 39.8 % — SIGNIFICANT CHANGE UP (ref 34.5–45)
HGB BLD-MCNC: 13 G/DL — SIGNIFICANT CHANGE UP (ref 11.5–15.5)
MCHC RBC-ENTMCNC: 29.7 PG — SIGNIFICANT CHANGE UP (ref 27–34)
MCHC RBC-ENTMCNC: 32.7 GM/DL — SIGNIFICANT CHANGE UP (ref 32–36)
MCV RBC AUTO: 91.1 FL — SIGNIFICANT CHANGE UP (ref 80–100)
NRBC # BLD: 0 /100 WBCS — SIGNIFICANT CHANGE UP (ref 0–0)
PLATELET # BLD AUTO: 152 K/UL — SIGNIFICANT CHANGE UP (ref 150–400)
POTASSIUM SERPL-MCNC: 3.9 MMOL/L — SIGNIFICANT CHANGE UP (ref 3.5–5.3)
POTASSIUM SERPL-SCNC: 3.9 MMOL/L — SIGNIFICANT CHANGE UP (ref 3.5–5.3)
RBC # BLD: 4.37 M/UL — SIGNIFICANT CHANGE UP (ref 3.8–5.2)
RBC # FLD: 14.6 % — HIGH (ref 10.3–14.5)
SODIUM SERPL-SCNC: 137 MMOL/L — SIGNIFICANT CHANGE UP (ref 135–145)
WBC # BLD: 8.42 K/UL — SIGNIFICANT CHANGE UP (ref 3.8–10.5)
WBC # FLD AUTO: 8.42 K/UL — SIGNIFICANT CHANGE UP (ref 3.8–10.5)

## 2024-02-29 PROCEDURE — 73030 X-RAY EXAM OF SHOULDER: CPT

## 2024-02-29 PROCEDURE — 84484 ASSAY OF TROPONIN QUANT: CPT

## 2024-02-29 PROCEDURE — 70450 CT HEAD/BRAIN W/O DYE: CPT | Mod: MC

## 2024-02-29 PROCEDURE — 74177 CT ABD & PELVIS W/CONTRAST: CPT | Mod: MC

## 2024-02-29 PROCEDURE — 97165 OT EVAL LOW COMPLEX 30 MIN: CPT

## 2024-02-29 PROCEDURE — 76000 FLUOROSCOPY <1 HR PHYS/QHP: CPT

## 2024-02-29 PROCEDURE — 85025 COMPLETE CBC W/AUTO DIFF WBC: CPT

## 2024-02-29 PROCEDURE — 72190 X-RAY EXAM OF PELVIS: CPT

## 2024-02-29 PROCEDURE — 85018 HEMOGLOBIN: CPT

## 2024-02-29 PROCEDURE — 72125 CT NECK SPINE W/O DYE: CPT | Mod: MC

## 2024-02-29 PROCEDURE — 96376 TX/PRO/DX INJ SAME DRUG ADON: CPT

## 2024-02-29 PROCEDURE — 86850 RBC ANTIBODY SCREEN: CPT

## 2024-02-29 PROCEDURE — C1713: CPT

## 2024-02-29 PROCEDURE — 85027 COMPLETE CBC AUTOMATED: CPT

## 2024-02-29 PROCEDURE — 73110 X-RAY EXAM OF WRIST: CPT

## 2024-02-29 PROCEDURE — 80053 COMPREHEN METABOLIC PANEL: CPT

## 2024-02-29 PROCEDURE — 83605 ASSAY OF LACTIC ACID: CPT

## 2024-02-29 PROCEDURE — 85730 THROMBOPLASTIN TIME PARTIAL: CPT

## 2024-02-29 PROCEDURE — 84295 ASSAY OF SERUM SODIUM: CPT

## 2024-02-29 PROCEDURE — 84132 ASSAY OF SERUM POTASSIUM: CPT

## 2024-02-29 PROCEDURE — 73080 X-RAY EXAM OF ELBOW: CPT

## 2024-02-29 PROCEDURE — 70498 CT ANGIOGRAPHY NECK: CPT | Mod: MC

## 2024-02-29 PROCEDURE — 71260 CT THORAX DX C+: CPT | Mod: MC

## 2024-02-29 PROCEDURE — 82803 BLOOD GASES ANY COMBINATION: CPT

## 2024-02-29 PROCEDURE — 80048 BASIC METABOLIC PNL TOTAL CA: CPT

## 2024-02-29 PROCEDURE — 82435 ASSAY OF BLOOD CHLORIDE: CPT

## 2024-02-29 PROCEDURE — 86900 BLOOD TYPING SEROLOGIC ABO: CPT

## 2024-02-29 PROCEDURE — 85610 PROTHROMBIN TIME: CPT

## 2024-02-29 PROCEDURE — 82330 ASSAY OF CALCIUM: CPT

## 2024-02-29 PROCEDURE — 86901 BLOOD TYPING SEROLOGIC RH(D): CPT

## 2024-02-29 PROCEDURE — 97162 PT EVAL MOD COMPLEX 30 MIN: CPT

## 2024-02-29 PROCEDURE — 71045 X-RAY EXAM CHEST 1 VIEW: CPT

## 2024-02-29 PROCEDURE — 70496 CT ANGIOGRAPHY HEAD: CPT | Mod: MC

## 2024-02-29 PROCEDURE — 73100 X-RAY EXAM OF WRIST: CPT

## 2024-02-29 PROCEDURE — 83690 ASSAY OF LIPASE: CPT

## 2024-02-29 PROCEDURE — 99291 CRITICAL CARE FIRST HOUR: CPT | Mod: 25

## 2024-02-29 PROCEDURE — 73060 X-RAY EXAM OF HUMERUS: CPT

## 2024-02-29 PROCEDURE — 85014 HEMATOCRIT: CPT

## 2024-02-29 PROCEDURE — 84702 CHORIONIC GONADOTROPIN TEST: CPT

## 2024-02-29 PROCEDURE — 80307 DRUG TEST PRSMV CHEM ANLYZR: CPT

## 2024-02-29 PROCEDURE — 73090 X-RAY EXAM OF FOREARM: CPT

## 2024-02-29 PROCEDURE — 82947 ASSAY GLUCOSE BLOOD QUANT: CPT

## 2024-02-29 PROCEDURE — 72170 X-RAY EXAM OF PELVIS: CPT

## 2024-02-29 PROCEDURE — 73130 X-RAY EXAM OF HAND: CPT

## 2024-02-29 PROCEDURE — 96374 THER/PROPH/DIAG INJ IV PUSH: CPT

## 2024-02-29 PROCEDURE — 96375 TX/PRO/DX INJ NEW DRUG ADDON: CPT

## 2024-02-29 RX ORDER — ASPIRIN/CALCIUM CARB/MAGNESIUM 324 MG
1 TABLET ORAL
Qty: 60 | Refills: 0
Start: 2024-02-29 | End: 2024-03-29

## 2024-02-29 RX ORDER — OXYCODONE HYDROCHLORIDE 5 MG/1
1 TABLET ORAL
Qty: 42 | Refills: 0
Start: 2024-02-29 | End: 2024-03-06

## 2024-02-29 RX ORDER — NALOXONE HYDROCHLORIDE 4 MG/.1ML
4 SPRAY NASAL
Qty: 1 | Refills: 0
Start: 2024-02-29

## 2024-02-29 RX ADMIN — Medication 975 MILLIGRAM(S): at 15:12

## 2024-02-29 RX ADMIN — Medication 975 MILLIGRAM(S): at 14:12

## 2024-02-29 RX ADMIN — Medication 1 TABLET(S): at 05:47

## 2024-02-29 RX ADMIN — SODIUM CHLORIDE 1000 MILLILITER(S): 9 INJECTION INTRAMUSCULAR; INTRAVENOUS; SUBCUTANEOUS at 08:29

## 2024-02-29 RX ADMIN — OXYCODONE HYDROCHLORIDE 5 MILLIGRAM(S): 5 TABLET ORAL at 01:30

## 2024-02-29 RX ADMIN — Medication 100 MILLIGRAM(S): at 01:59

## 2024-02-29 RX ADMIN — ENOXAPARIN SODIUM 40 MILLIGRAM(S): 100 INJECTION SUBCUTANEOUS at 08:29

## 2024-02-29 RX ADMIN — Medication 975 MILLIGRAM(S): at 05:47

## 2024-02-29 RX ADMIN — OXYCODONE HYDROCHLORIDE 5 MILLIGRAM(S): 5 TABLET ORAL at 15:12

## 2024-02-29 RX ADMIN — OXYCODONE HYDROCHLORIDE 5 MILLIGRAM(S): 5 TABLET ORAL at 14:12

## 2024-02-29 RX ADMIN — OXYCODONE HYDROCHLORIDE 5 MILLIGRAM(S): 5 TABLET ORAL at 11:00

## 2024-02-29 RX ADMIN — Medication 1 TABLET(S): at 14:12

## 2024-02-29 RX ADMIN — FAMOTIDINE 20 MILLIGRAM(S): 10 INJECTION INTRAVENOUS at 05:47

## 2024-02-29 RX ADMIN — OXYCODONE HYDROCHLORIDE 5 MILLIGRAM(S): 5 TABLET ORAL at 00:33

## 2024-02-29 RX ADMIN — OXYCODONE HYDROCHLORIDE 5 MILLIGRAM(S): 5 TABLET ORAL at 10:00

## 2024-02-29 NOTE — OCCUPATIONAL THERAPY INITIAL EVALUATION ADULT - SIT-TO-STAND BALANCE
Pt with tachypnea and diff breathing pt has intercostal retractions and diminished lung sounds b/l Pt is alert awake, and appropriate, in no acute distress, o2 sat 100% on room air clear lungs b/l, no increased work of breathing, last albuterol 1 hour ago EMS hand off received apical pulse auscultate good balance
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
## 2024-02-29 NOTE — OCCUPATIONAL THERAPY INITIAL EVALUATION ADULT - LEVEL OF INDEPENDENCE: DRESS UPPER BODY, OT EVAL
joon/[facility] gown to simulate jacket/supervision joon/[facility] gowcarmen to simulate jacket/independent

## 2024-02-29 NOTE — OCCUPATIONAL THERAPY INITIAL EVALUATION ADULT - ADL RETRAINING, OT EVAL
Pt will perform lower body dressing (I) within 4 weeks. Pt will perform upper body dressing (I) within 4 weeks.

## 2024-02-29 NOTE — OCCUPATIONAL THERAPY INITIAL EVALUATION ADULT - LIVES WITH, PROFILE
Pt lives in apt with daughter and son, no stairs to enter, 3 flights inside +handrail. Pt has walk-in shower with doors, -grab bars. -. Pt was (I) in all ADLs and functional transfers prior to admission. -AD/DME./children

## 2024-02-29 NOTE — OCCUPATIONAL THERAPY INITIAL EVALUATION ADULT - RANGE OF MOTION EXAMINATION, UPPER EXTREMITY
except; L shoulder WFL, elbow WFL, wrist N/T, digits WFL/Left UE Active ROM was WFL (within functional limits)/Right UE Active ROM was WNL (within normal limits) except; L wrist N/T, digits limited by cast, approx 1/2 range/Left UE Active ROM was WFL (within functional limits)/Right UE Active ROM was WNL (within normal limits)
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
## 2024-02-29 NOTE — PROGRESS NOTE ADULT - PROBLEM SELECTOR PLAN 3
potassium has improved  continue to monitor  replete with IV potassium as needed
potassium has improved  continue to monitor  replete with IV potassium as needed

## 2024-02-29 NOTE — PROGRESS NOTE ADULT - PROBLEM SELECTOR PLAN 1
Patient s/p an Open reduction and internal fixation of the left forearm  tolerated the procedure well  PO as tolerated  To Start physical therapy   DVT and GI prophylaxis as per ortho.
Patient s/p an Open reduction and internal fixation of the left forearm  tolerated the procedure well  PO as tolerated  To Start physical therapy   DVT and GI prophylaxis as per ortho.

## 2024-02-29 NOTE — DISCHARGE NOTE NURSING/CASE MANAGEMENT/SOCIAL WORK - PATIENT PORTAL LINK FT
You can access the FollowMyHealth Patient Portal offered by [facility] by registering at the following website: [email or web address]. By joining [employer]’s FollowMyHealth portal, you will also be able to view your health information using other applications (apps) compatible with our system.
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
## 2024-03-05 NOTE — CHART NOTE - NSCHARTNOTEFT_GEN_A_CORE
Post-Discharge Medication Review    Patient's preferred pharmacy was updated in OMR: [facility] (prefers delivery)    Patient contacted to offer medication counseling post-discharge. Medication reconciliation completed.     Per patient, medications include:  1. acetaminophen 325 mg oral tablet 3 tab(s) orally every 8 hours x ONE (1) WEEK, then as needed  2. Ecotrin Adult Low Strength 81 mg oral delayed release tablet 1 tab(s) orally 2 times a day x (4) weeks or when told to STOP by your SURGEON  3. famotidine 20 mg oral tablet 1 tab(s) orally 2 times a day Purchase over-the-counter and continue taking while on pain meds to prevent upset stomach.  4. Multiple Vitamins oral tablet 1 tab(s) orally once a day  5. Narcan 4 mg/0.1 mL nasal spray 4 milligram(s) intranasally every 2 to 3 minutes as needed for overdose 1-2 sprays alternating nostrils every 2-3 minutes AS NEEDED  6. oxyCODONE 5 mg oral tablet 1 tab(s) orally every 4 hours as needed for Severe Pain MDD: 6  7. senna leaf extract oral tablet 2 tab(s) orally once a day (at bedtime) while on pain medications  8. Ibuprofen 200 mG oral tablet 1 tab every 8-12 hours PRN (per patient; to be added to OMR)  9. calcium carbonate 600 mG with vit D3 oral tablet ([employer] brand) 1 tab twice daily (per patient; to be added to OMR)    Per patient, not taking the below (to be removed from OMR):  - calcium carbonate 1250 mg (500 mg elemental calcium) oral tablet 1 tab(s) orally 2 times a day    Medication name, indication, administration, side effect, and monitoring reviewed for new medications post-discharge with patient. Patient demonstrated understanding. Counseling offered for all medications.    Discussed with patient risk for increased bleeding from concurrent aspirin and ibuprofen use; advised patient to take acetaminophen, per discharge instructions, for pain instead of ibuprofen. Patient expressed understanding and is planning on purchasing/taking acetaminophen. Post-Discharge Medication Review    Patient's preferred pharmacy was updated in OMR: [facility] (prefers delivery)    Patient contacted to offer medication counseling post-discharge. Medication reconciliation completed.     Per patient, medications include:  1. acetaminophen 325 mg oral tablet 3 tab(s) orally every 8 hours x ONE (1) WEEK, then as needed  2. Ecotrin Adult Low Strength 81 mg oral delayed release tablet 1 tab(s) orally 2 times a day x (4) weeks or when told to STOP by your SURGEON  3. famotidine 20 mg oral tablet 1 tab(s) orally 2 times a day Purchase over-the-counter and continue taking while on pain meds to prevent upset stomach.  4.. Narcan 4 mg/0.1 mL nasal spray 4 milligram(s) intranasally every 2 to 3 minutes as needed for overdose 1-2 sprays alternating nostrils every 2-3 minutes AS NEEDED  5. oxyCODONE 5 mg oral tablet 1 tab(s) orally every 4 hours as needed for Severe Pain MDD: 6  6. senna leaf extract oral tablet 2 tab(s) orally once a day (at bedtime) while on pain medications  7. Ibuprofen 200 mG oral tablet 1 tab every 8-12 hours PRN (per patient; to be added to OMR)  8. calcium carbonate 600 mG with vit D3 oral tablet ([employer] brand) 1 tab twice daily (per patient; to be added to OMR)  9. AG1 by [employer] every other day (per patient; to be added to OMR)  10. Multiple Vitamin oral tablet 1 tab orally every other day (per patient; to be added to OMR)    Per patient, not taking the below (to be removed from OMR):  - calcium carbonate 1250 mg (500 mg elemental calcium) oral tablet 1 tab(s) orally 2 times a day  - Multiple Vitamins oral tablet 1 tab(s) orally once a day    Medication name, indication, administration, side effect, and monitoring reviewed for new medications post-discharge with patient. Patient demonstrated understanding. Counseling offered for all medications.    Patient states she alternates taking multivitamin and AG1 by [employer] every other day.    Discussed with patient risk for increased bleeding from concurrent aspirin and ibuprofen use; advised patient to take acetaminophen, per discharge instructions, for pain instead of ibuprofen. Patient expressed understanding and is planning on purchasing/taking acetaminophen.

## 2024-03-14 ENCOUNTER — APPOINTMENT (OUTPATIENT)
Dept: ORTHOPEDIC SURGERY | Facility: CLINIC | Age: 44
End: 2024-03-14
Payer: COMMERCIAL

## 2024-03-14 VITALS — BODY MASS INDEX: 19.12 KG/M2 | HEIGHT: 64 IN | WEIGHT: 112 LBS

## 2024-03-14 PROBLEM — Z00.00 ENCOUNTER FOR PREVENTIVE HEALTH EXAMINATION: Status: ACTIVE | Noted: 2024-03-14

## 2024-03-14 PROCEDURE — 73110 X-RAY EXAM OF WRIST: CPT | Mod: LT

## 2024-03-14 PROCEDURE — 99024 POSTOP FOLLOW-UP VISIT: CPT

## 2024-03-15 NOTE — HISTORY OF PRESENT ILLNESS
[de-identified] : The patient is a 43 year female who returns for the 1st postoperative visit after undergoing ORIF of Left Radius and Ulna at [facility]. The surgery was on 02/28/2024. The patient is recovering at home. She reports mild postoperative pain. [de-identified] : 1st POA s/p ORIF of Left Radius and Ulna [de-identified] : Patient is WDWN, alert, and in no acute distress. Breathing is unlabored. She is grossly oriented to person, place, and time.   Left Wrist:   ROM: 5-150 Rotation is limited Incision is healing well. There are no signs of infection. Sutures were removed. Normal amount of postoperative edema and tenderness present. [de-identified] : The underlying pathophysiology was reviewed with the patient. XR films were reviewed with the patient. Discussed at length the nature of the patient's condition.   Patient was advised to take OTC medications and topical analgesic for pain management.  The sutures were removed. The patient was instructed to keep the incision site dry for 14 days from the date of surgery. They may then begin to massage the scar with vitamin E oil. Gentle range of motion, stretching, and strengthening exercises were encouraged. Patient should actively work on gradually increasing use of the hand, as tolerated.   All questions answered, understanding verbalized. Patient in agreement with plan of care.   Patient was advised to follow up in 4 weeks.   [de-identified] :  AP, lateral and oblique views of the Left Wrist were obtained today and revealed distal and ulna fracture. The fracture is well aligned. Hardware is well positioned.

## 2024-03-15 NOTE — END OF VISIT
[FreeTextEntry3] :  All medical record entries made by the Scribe were at my,  Dr. Ernesto Schaeffer MD., direction and personally dictated by me on 03/14/2024. I have personally reviewed the chart and agree that the record accurately reflects my personal performance of the history, physical exam, assessment and plan.

## 2024-03-15 NOTE — ADDENDUM
[FreeTextEntry1] :  I, Reena Vora wrote this note acting as a scribe for Dr. Ernesto Schaeffer on Mar 14, 2024.   
[id number] Comprehensive

## 2024-03-16 PROBLEM — Z78.9 OTHER SPECIFIED HEALTH STATUS: Chronic | Status: ACTIVE | Noted: 2024-02-27

## 2024-03-21 ENCOUNTER — APPOINTMENT (OUTPATIENT)
Dept: ORTHOPEDIC SURGERY | Facility: CLINIC | Age: 44
End: 2024-03-21
Payer: COMMERCIAL

## 2024-03-21 VITALS — WEIGHT: 115 LBS | HEIGHT: 64 IN | BODY MASS INDEX: 19.63 KG/M2

## 2024-03-21 PROCEDURE — 99024 POSTOP FOLLOW-UP VISIT: CPT

## 2024-03-21 PROCEDURE — 73110 X-RAY EXAM OF WRIST: CPT | Mod: LT

## 2024-04-15 ENCOUNTER — APPOINTMENT (OUTPATIENT)
Dept: ORTHOPEDIC SURGERY | Facility: CLINIC | Age: 44
End: 2024-04-15
Payer: COMMERCIAL

## 2024-04-15 PROCEDURE — 99024 POSTOP FOLLOW-UP VISIT: CPT

## 2024-04-15 PROCEDURE — 73110 X-RAY EXAM OF WRIST: CPT | Mod: LT

## 2024-05-30 ENCOUNTER — APPOINTMENT (OUTPATIENT)
Dept: ORTHOPEDIC SURGERY | Facility: CLINIC | Age: 44
End: 2024-05-30
Payer: COMMERCIAL

## 2024-05-30 VITALS — HEIGHT: 64 IN | WEIGHT: 115 LBS | BODY MASS INDEX: 19.63 KG/M2

## 2024-05-30 DIAGNOSIS — S52.609A UNSPECIFIED FRACTURE OF THE LOWER END OF UNSPECIFIED RADIUS, INITIAL ENCOUNTER FOR CLOSED FRACTURE: ICD-10-CM

## 2024-05-30 DIAGNOSIS — S52.509A UNSPECIFIED FRACTURE OF THE LOWER END OF UNSPECIFIED RADIUS, INITIAL ENCOUNTER FOR CLOSED FRACTURE: ICD-10-CM

## 2024-05-30 PROCEDURE — 99213 OFFICE O/P EST LOW 20 MIN: CPT | Mod: 25

## 2024-05-30 PROCEDURE — 73090 X-RAY EXAM OF FOREARM: CPT | Mod: LT

## 2024-07-01 ENCOUNTER — APPOINTMENT (OUTPATIENT)
Dept: ORTHOPEDIC SURGERY | Facility: CLINIC | Age: 44
End: 2024-07-01
Payer: COMMERCIAL

## 2024-07-01 DIAGNOSIS — M25.512 PAIN IN LEFT SHOULDER: ICD-10-CM

## 2024-07-01 PROCEDURE — 73030 X-RAY EXAM OF SHOULDER: CPT | Mod: LT

## 2024-07-01 PROCEDURE — 99213 OFFICE O/P EST LOW 20 MIN: CPT

## 2024-07-03 ENCOUNTER — APPOINTMENT (OUTPATIENT)
Dept: MRI IMAGING | Facility: CLINIC | Age: 44
End: 2024-07-03

## 2024-07-03 ENCOUNTER — TRANSCRIPTION ENCOUNTER (OUTPATIENT)
Age: 44
End: 2024-07-03

## 2024-07-03 PROCEDURE — 73221 MRI JOINT UPR EXTREM W/O DYE: CPT | Mod: LT

## 2024-07-05 ENCOUNTER — NON-APPOINTMENT (OUTPATIENT)
Age: 44
End: 2024-07-05

## 2024-08-26 ENCOUNTER — APPOINTMENT (OUTPATIENT)
Dept: ORTHOPEDIC SURGERY | Facility: CLINIC | Age: 44
End: 2024-08-26
Payer: COMMERCIAL

## 2024-08-26 DIAGNOSIS — S52.509A UNSPECIFIED FRACTURE OF THE LOWER END OF UNSPECIFIED RADIUS, INITIAL ENCOUNTER FOR CLOSED FRACTURE: ICD-10-CM

## 2024-08-26 DIAGNOSIS — S52.609A UNSPECIFIED FRACTURE OF THE LOWER END OF UNSPECIFIED RADIUS, INITIAL ENCOUNTER FOR CLOSED FRACTURE: ICD-10-CM

## 2024-08-26 PROCEDURE — 99213 OFFICE O/P EST LOW 20 MIN: CPT

## 2024-08-26 PROCEDURE — 73110 X-RAY EXAM OF WRIST: CPT | Mod: LT

## 2024-08-26 RX ORDER — METHYLPREDNISOLONE 4 MG/1
4 TABLET ORAL
Qty: 1 | Refills: 0 | Status: ACTIVE | COMMUNITY
Start: 2024-08-26 | End: 1900-01-01

## 2024-08-26 NOTE — HISTORY OF PRESENT ILLNESS
[de-identified] : The patient is a 44 year female who returns after undergoing ORIF of Left Radius and Ulna at [facility]. The surgery was on 02/28/2024.  On, May 30, 2024 patient was here for a follow up and further evaluation. She states that the pain has decreased, and she is going to PT. On Jul 01, 2024 patient was here for a follow up and further evaluation. She states that she has pain on the left shoulder.   Today, Aug 26, 2024, the patient presents for follow-up and further management. The patient states that she continues to have pain pertaining to her left wrist and left shoulder. She currently attends physical therapy for her left wrist and shoulder. She notes that she has a limited ROM to her left shoulder. She states that she has previously received an injection to her left shoulder.

## 2024-08-26 NOTE — DISCUSSION/SUMMARY
[de-identified] :  The underlying pathophysiology was reviewed with the patient. XR films were reviewed with the patient. Discussed at length the nature of the patient's condition. I reassured the patient that her left shoulder as no fractures and her left wrist fracture has fully healed. The patient and I discussed her options regarding treatment.   I encouraged the patient to continue with physical therapy for her left wrist and left shoulder at this time. I provided the patient with a detailed prescription for physical therapy left shoulder and left wrist ROM and strengthening.   RX: Medrol Dose Pack   All questions answered, understanding verbalized. Patient in agreement with plan of care.  The patient should follow-up in 3 months for further assessment of her symptoms.

## 2024-08-26 NOTE — PHYSICAL EXAM
[de-identified] : Patient is WDWN, alert, and in no acute distress. Breathing is unlabored. She is grossly oriented to person, place, and time.  Left Shoulder:   Inspection/ Palpation: there is mild tenderness and no swelling, or deformities.  Range of Motion: active flexion, passive flexion, abduction, external rotation is limited with pain in all planes with no crepitus.  Strength: forward elevation, internal rotation, external rotation, adduction, and abduction are 5/5.  Stability: no joint instability on provocative testing.  Left Wrist: ROM: 5-150 Rotation is limited Incision is healing well. There are no signs of infection. Normal amount of postoperative edema and tenderness present. [de-identified] :  AP, lateral and oblique views of the Left Shoulder were obtained today and revealed no abnormalities. No acute fracture. No dislocation. Cartilage spaces are maintained.  AP, lateral and oblique views of the Left Wrist were obtained today and revealed distal and ulna fracture. Hardware is well positioned. The fracture is well aligned and there are increasing signs of interval healing. Fractures is 95% healed.

## 2024-08-26 NOTE — ADDENDUM
[FreeTextEntry1] : I, Wiliam Lara Jr, acted solely as a scribe for Dr. Ernesto Schaeffer on this date 08/26/2024  All medical record entries made by the Scribe were at my, Dr. Ernesto Schaeffer, direction and personally dictated by me on 08/26/2024 . I have reviewed the chart and agree that the record accurately reflects my personal performance of the history, physical exam, assessment and plan. I have also personally directed, reviewed, and agreed with the chart.

## 2024-11-21 ENCOUNTER — APPOINTMENT (OUTPATIENT)
Dept: ORTHOPEDIC SURGERY | Facility: CLINIC | Age: 44
End: 2024-11-21
Payer: COMMERCIAL

## 2024-11-21 VITALS — WEIGHT: 116 LBS | HEIGHT: 64 IN | BODY MASS INDEX: 19.81 KG/M2

## 2024-11-21 DIAGNOSIS — S52.609A UNSPECIFIED FRACTURE OF THE LOWER END OF UNSPECIFIED RADIUS, INITIAL ENCOUNTER FOR CLOSED FRACTURE: ICD-10-CM

## 2024-11-21 DIAGNOSIS — S52.509A UNSPECIFIED FRACTURE OF THE LOWER END OF UNSPECIFIED RADIUS, INITIAL ENCOUNTER FOR CLOSED FRACTURE: ICD-10-CM

## 2024-11-21 DIAGNOSIS — M25.512 PAIN IN LEFT SHOULDER: ICD-10-CM

## 2024-11-21 PROCEDURE — 20610 DRAIN/INJ JOINT/BURSA W/O US: CPT | Mod: LT

## 2024-11-21 PROCEDURE — 99213 OFFICE O/P EST LOW 20 MIN: CPT | Mod: 25

## 2024-11-21 PROCEDURE — 73110 X-RAY EXAM OF WRIST: CPT | Mod: LT
